# Patient Record
Sex: FEMALE | Race: AMERICAN INDIAN OR ALASKA NATIVE
[De-identification: names, ages, dates, MRNs, and addresses within clinical notes are randomized per-mention and may not be internally consistent; named-entity substitution may affect disease eponyms.]

---

## 2020-12-14 ENCOUNTER — HOSPITAL ENCOUNTER (EMERGENCY)
Dept: HOSPITAL 5 - ED | Age: 40
LOS: 1 days | Discharge: LEFT BEFORE DISCHARGE | End: 2020-12-15
Payer: MEDICAID

## 2020-12-14 VITALS — DIASTOLIC BLOOD PRESSURE: 91 MMHG | SYSTOLIC BLOOD PRESSURE: 151 MMHG

## 2020-12-14 DIAGNOSIS — K64.8: Primary | ICD-10-CM

## 2020-12-14 DIAGNOSIS — Z53.21: ICD-10-CM

## 2020-12-15 ENCOUNTER — HOSPITAL ENCOUNTER (EMERGENCY)
Dept: HOSPITAL 5 - ED | Age: 40
Discharge: HOME | End: 2020-12-15
Payer: MEDICAID

## 2020-12-15 VITALS — SYSTOLIC BLOOD PRESSURE: 137 MMHG | DIASTOLIC BLOOD PRESSURE: 71 MMHG

## 2020-12-15 DIAGNOSIS — K59.00: ICD-10-CM

## 2020-12-15 DIAGNOSIS — N39.0: Primary | ICD-10-CM

## 2020-12-15 LAB
%HYPO/RBC NFR BLD AUTO: (no result) %
ALBUMIN SERPL-MCNC: 3.6 G/DL (ref 3.9–5)
ALT SERPL-CCNC: 17 UNITS/L (ref 7–56)
BAND NEUTROPHILS # (MANUAL): 0 K/MM3
BILIRUB UR QL STRIP: (no result)
BLOOD UR QL VISUAL: (no result)
BUN SERPL-MCNC: 14 MG/DL (ref 7–17)
BUN/CREAT SERPL: 23 %
CALCIUM SERPL-MCNC: 9.4 MG/DL (ref 8.4–10.2)
HCT VFR BLD CALC: 33.6 % (ref 30.3–42.9)
HEMOLYSIS INDEX: 9
HGB BLD-MCNC: 10.7 GM/DL (ref 10.1–14.3)
MCHC RBC AUTO-ENTMCNC: 32 % (ref 30–34)
MCV RBC AUTO: 79 FL (ref 79–97)
MUCOUS THREADS #/AREA URNS HPF: (no result) /HPF
MYELOCYTES # (MANUAL): 0 K/MM3
PH UR STRIP: 6 [PH] (ref 5–7)
PLATELET # BLD: 259 K/MM3 (ref 140–440)
PROMYELOCYTES # (MANUAL): 0 K/MM3
RBC # BLD AUTO: 4.23 M/MM3 (ref 3.65–5.03)
RBC #/AREA URNS HPF: 163 /HPF (ref 0–6)
TOTAL CELLS COUNTED BLD: 100
UROBILINOGEN UR-MCNC: < 2 MG/DL (ref ?–2)
WBC #/AREA URNS HPF: > 182 /HPF (ref 0–6)

## 2020-12-15 PROCEDURE — 74018 RADEX ABDOMEN 1 VIEW: CPT

## 2020-12-15 PROCEDURE — 85007 BL SMEAR W/DIFF WBC COUNT: CPT

## 2020-12-15 PROCEDURE — 96372 THER/PROPH/DIAG INJ SC/IM: CPT

## 2020-12-15 PROCEDURE — 81001 URINALYSIS AUTO W/SCOPE: CPT

## 2020-12-15 PROCEDURE — 80053 COMPREHEN METABOLIC PANEL: CPT

## 2020-12-15 PROCEDURE — 84703 CHORIONIC GONADOTROPIN ASSAY: CPT

## 2020-12-15 PROCEDURE — 99284 EMERGENCY DEPT VISIT MOD MDM: CPT

## 2020-12-15 PROCEDURE — 85025 COMPLETE CBC W/AUTO DIFF WBC: CPT

## 2020-12-15 PROCEDURE — 71045 X-RAY EXAM CHEST 1 VIEW: CPT

## 2020-12-15 PROCEDURE — 36415 COLL VENOUS BLD VENIPUNCTURE: CPT

## 2020-12-15 NOTE — XRAY REPORT
ABDOMEN 1 VIEW(S)



INDICATION / CLINICAL INFORMATION:

abd pain.



COMPARISON: 

None available.



FINDINGS:



TUBES / LINES: None.

BOWEL GAS PATTERN: Constipation. No dilated loops of small bowel. 

FREE AIR / EXTRALUMINAL GAS: None seen.



ADDITIONAL FINDINGS: No significant additional findings.



IMPRESSION:

1. Constipation.



Signer Name: Alfonso Rockwell MD 

Signed: 12/15/2020 8:43 PM

Workstation Name: [id number]

## 2020-12-15 NOTE — EVENT NOTE
ED Screening Note


ED Screening Note: 








hemorrhoids 


states she she has been around someone with COVID 19


suprapubic abd pain 


+dysuria 


PMHx AIDS


not seeing infectious disease 


not on any antivirals 


no allergies to meds 


LNMP: november This initial assessment/diagnostic orders/clinical plan/treatment(s) is/are 

subject to change based on patients health status, clinical progression and re-

assessment by fellow clinical providers in the ED. Further treatment and workup 

at subsequent clinical providers discretion. Patient/guardian urged not to elope

from the ED as their condition may be serious if not clinically assessed and 

managed. 





Initial orders include: 


labs, UA

## 2020-12-15 NOTE — EMERGENCY DEPARTMENT REPORT
ED General Adult HPI





- General


Chief complaint: Upper Respiratory Infection


Stated complaint: SYMPTOMS OF COVID, KIDNEY INFECTION


Time Seen by Provider: 12/15/20 15:10


Source: patient


Mode of arrival: Ambulatory


Limitations: No Limitations





- History of Present Illness


Initial comments: 


Patient is a 39-year-old female with a history of HIV, who presents for 

constipation, blood-streaked stool x1 history of hemorrhoids, states exposure to

Covid positive family member.  Patient denies fevers or chills.  States 

abdominal aching and spasms for the past 3 days.  Patient is tolerating p.o. 

intake without nausea vomiting.  There is no fever or chills, patient does 

however endorse dysuria and frequency.  











Severity scale (0 -10): 3





- Related Data


                                  Previous Rx's











 Medication  Instructions  Recorded  Last Taken  Type


 


Docusate Sodium [Colace] 100 mg PO BID #30 capsule 12/15/20 Unknown Rx


 


Fluconazole (Nf) [Diflucan TAB] 150 mg PO ONCE #1 tablet 12/15/20 Unknown Rx


 


Hydrocortisone [Anusol-Hc 2.5% TOP 1 applicatio RC BID #1 tube 12/15/20 Unknown 

Rx





CREAM]    


 


Nitrofurantoin Mono/M-Cryst 100 mg PO Q12HR 7 Days #14 capsule 12/15/20 Unknown 

Rx





[Macrobid CAP]    


 


polyethylene glycoL 3350 [Miralax 17 gm PO BID PRN #14 packet 12/15/20 Unknown 

Rx





3350]    











                                    Allergies











Allergy/AdvReac Type Severity Reaction Status Date / Time


 


No Known Allergies Allergy   Unverified 12/14/20 23:38














ED Review of Systems


ROS: 


Stated complaint: SYMPTOMS OF COVID, KIDNEY INFECTION


Other details as noted in HPI





Constitutional: denies: chills, fever


Eyes: denies: eye pain, eye discharge, vision change


ENT: denies: ear pain, throat pain


Respiratory: denies: cough, shortness of breath, wheezing


Cardiovascular: denies: chest pain, palpitations


Endocrine: no symptoms reported


Gastrointestinal: constipation.  denies: abdominal pain, nausea, vomiting, 

diarrhea, melena


Genitourinary: denies: urgency, dysuria, frequency, hematuria, discharge


Musculoskeletal: as per HPI, back pain


Skin: denies: rash, lesions


Neurological: denies: headache, weakness, paresthesias


Psychiatric: denies: anxiety, depression


Hematological/Lymphatic: as per HPI





ED Past Medical Hx





- Past Medical History


Previous Medical History?: Yes


Additional medical history: AIDS





- Surgical History


Past Surgical History?: Yes





- Social History


Smoking Status: Never Smoker


Substance Use Type: None





- Medications


Home Medications: 


                                Home Medications











 Medication  Instructions  Recorded  Confirmed  Last Taken  Type


 


Docusate Sodium [Colace] 100 mg PO BID #30 capsule 12/15/20  Unknown Rx


 


Fluconazole (Nf) [Diflucan TAB] 150 mg PO ONCE #1 tablet 12/15/20  Unknown Rx


 


Hydrocortisone [Anusol-Hc 2.5% TOP 1 applicatio RC BID #1 tube 12/15/20  Unknown

 Rx





CREAM]     


 


Nitrofurantoin Mono/M-Cryst 100 mg PO Q12HR 7 Days #14 capsule 12/15/20  Unknown

Rx





[Macrobid CAP]     


 


polyethylene glycoL 3350 [Miralax 17 gm PO BID PRN #14 packet 12/15/20  Unknown 

Rx





3350]     














ED Physical Exam





- General


Limitations: No Limitations


General appearance: alert, in no apparent distress





- Head


Head exam: Present: atraumatic, normocephalic





- Eye


Eye exam: Present: normal appearance, EOMI


Pupils: Present: normal accommodation





- ENT


ENT exam: Present: normal exam





- Neck


Neck exam: Present: normal inspection, full ROM.  Absent: tenderness





- Respiratory


Respiratory exam: Present: normal lung sounds bilaterally.  Absent: respiratory 

distress, wheezes, stridor, chest wall tenderness





- Cardiovascular


Cardiovascular Exam: Present: regular rate, normal rhythm, normal heart sounds. 

Absent: systolic murmur, diastolic murmur, rubs, gallop





- GI/Abdominal


GI/Abdominal exam: Present: soft, normal bowel sounds.  Absent: distended, 

tenderness, guarding, rigid, bruit, hernia





- Rectal


Rectal exam: Present: deferred





- Extremities Exam


Extremities exam: Present: normal inspection, full ROM, normal capillary refill.

 Absent: tenderness





- Back Exam


Back exam: Present: normal inspection, full ROM.  Absent: tenderness, CVA 

tenderness (R), CVA tenderness (L), vertebral tenderness





- Neurological Exam


Neurological exam: Present: alert, oriented X3, CN II-XII intact, normal gait, 

reflexes normal





- Psychiatric


Psychiatric exam: Present: normal affect, normal mood





- Skin


Skin exam: Present: warm, dry, intact, normal color.  Absent: rash





ED Course


                                   Vital Signs











  12/15/20





  12:45


 


Temperature 98.1 F


 


Pulse Rate 64


 


Respiratory 18





Rate 


 


Blood Pressure 137/71





[Right] 


 


O2 Sat by Pulse 99





Oximetry 














ED Medical Decision Making





- Lab Data


Result diagrams: 


                                 12/15/20 15:34





                                 12/15/20 15:34








Labs











  12/15/20 12/15/20 12/15/20





  15:34 15:34 15:34


 


WBC  3.0 L  


 


RBC  4.23  


 


Hgb  10.7  


 


Hct  33.6  


 


MCV  79  


 


MCH  25 L  


 


MCHC  32  


 


RDW  14.7  


 


Plt Count  259  


 


Eos % (Auto)  Np  


 


Add Manual Diff  Complete  


 


Total Counted  100  


 


Seg Neuts % (Manual)  64.0  


 


Lymphocytes % (Manual)  8.0 L  


 


Monocytes % (Manual)  9.0 H  


 


Basophils % (Manual)  1.0  


 


Nucleated RBC %  Not Reportable  


 


Seg Neutrophils # Man  1.9  


 


Band Neutrophils #  0.0  


 


Lymphocytes # (Manual)  0.2 L  


 


Abs React Lymphs (Man)  0.0  


 


Monocytes # (Manual)  0.3  


 


Eosinophils # (Manual)  0.5 H  


 


Basophils # (Manual)  0.0  


 


Metamyelocytes #  0.0  


 


Myelocytes #  0.0  


 


Promyelocytes #  0.0  


 


Blast Cells #  0.0  


 


WBC Morphology  Not Reportable  


 


Hypersegmented Neuts  Not Reportable  


 


Hyposegmented Neuts  Not Reportable  


 


Hypogranular Neuts  Not Reportable  


 


Smudge Cells  Not Reportable  


 


Toxic Granulation  Not Reportable  


 


Toxic Vacuolation  Not Reportable  


 


Dohle Bodies  Not Reportable  


 


Pelger-Huet Anomaly  Not Reportable  


 


Slava Rods  Not Reportable  


 


Platelet Estimate  Not Reportable  


 


Clumped Platelets  Not Reportable  


 


Plt Clumps, EDTA  Not Reportable  


 


Large Platelets  Not Reportable  


 


Giant Platelets  Not Reportable  


 


Platelet Satelliting  Not Reportable  


 


Plt Morphology Comment  Not Reportable  


 


RBC Morphology  Not Reportable  


 


Dimorphic RBCs  Not Reportable  


 


Polychromasia  Not Reportable  


 


Hypochromasia  1+  


 


Poikilocytosis  Not Reportable  


 


Anisocytosis  Few  


 


Microcytosis  Not Reportable  


 


Macrocytosis  Not Reportable  


 


Spherocytes  Not Reportable  


 


Pappenheimer Bodies  Not Reportable  


 


Sickle Cells  Not Reportable  


 


Target Cells  Not Reportable  


 


Tear Drop Cells  Not Reportable  


 


Ovalocytes  Not Reportable  


 


Helmet Cells  Not Reportable  


 


[place] Bodies  Not Reportable  


 


Cabot Rings  Not Reportable  


 


Arthur Cells  Not Reportable  


 


Bite Cells  Not Reportable  


 


Crenated Cell  Not Reportable  


 


Elliptocytes  Not Reportable  


 


Acanthocytes (Spur)  Not Reportable  


 


Rouleaux  Not Reportable  


 


Hemoglobin C Crystals  Not Reportable  


 


Schistocytes  Not Reportable  


 


Malaria parasites  Not Reportable  


 


Arturo Bodies  Not Reportable  


 


Hem Pathologist Commnt  No  


 


Sodium   137 


 


Potassium   4.2 


 


Chloride   104.1 


 


Carbon Dioxide   24 


 


Anion Gap   13 


 


BUN   14 


 


Creatinine   0.6 


 


Estimated GFR   > 60 


 


BUN/Creatinine Ratio   23 


 


Glucose   93 


 


Calcium   9.4 


 


Total Bilirubin   < 0.20 


 


AST   27 


 


ALT   17 


 


Alkaline Phosphatase   73 


 


Total Protein   7.8 


 


Albumin   3.6 L 


 


Albumin/Globulin Ratio   0.9 


 


HCG, Qual    Negative


 


Urine Color   


 


Urine Turbidity   


 


Urine pH   


 


Ur Specific Gravity   


 


Urine Protein   


 


Urine Glucose (UA)   


 


Urine Ketones   


 


Urine Blood   


 


Urine Nitrite   


 


Urine Bilirubin   


 


Urine Urobilinogen   


 


Ur Leukocyte Esterase   


 


Urine WBC (Auto)   


 


Urine RBC (Auto)   


 


U Epithel Cells (Auto)   


 


Urine WBC Clumps   


 


Urine Mucus   


 


Urine Yeast (Budding)   














  12/15/20





  Unknown


 


WBC 


 


RBC 


 


Hgb 


 


Hct 


 


MCV 


 


MCH 


 


MCHC 


 


RDW 


 


Plt Count 


 


Eos % (Auto) 


 


Add Manual Diff 


 


Total Counted 


 


Seg Neuts % (Manual) 


 


Lymphocytes % (Manual) 


 


Monocytes % (Manual) 


 


Basophils % (Manual) 


 


Nucleated RBC % 


 


Seg Neutrophils # Man 


 


Band Neutrophils # 


 


Lymphocytes # (Manual) 


 


Abs React Lymphs (Man) 


 


Monocytes # (Manual) 


 


Eosinophils # (Manual) 


 


Basophils # (Manual) 


 


Metamyelocytes # 


 


Myelocytes # 


 


Promyelocytes # 


 


Blast Cells # 


 


WBC Morphology 


 


Hypersegmented Neuts 


 


Hyposegmented Neuts 


 


Hypogranular Neuts 


 


Smudge Cells 


 


Toxic Granulation 


 


Toxic Vacuolation 


 


Dohle Bodies 


 


Pelger-Huet Anomaly 


 


Slava Rods 


 


Platelet Estimate 


 


Clumped Platelets 


 


Plt Clumps, EDTA 


 


Large Platelets 


 


Giant Platelets 


 


Platelet Satelliting 


 


Plt Morphology Comment 


 


RBC Morphology 


 


Dimorphic RBCs 


 


Polychromasia 


 


Hypochromasia 


 


Poikilocytosis 


 


Anisocytosis 


 


Microcytosis 


 


Macrocytosis 


 


Spherocytes 


 


Pappenheimer Bodies 


 


Sickle Cells 


 


Target Cells 


 


Tear Drop Cells 


 


Ovalocytes 


 


Helmet Cells 


 


[place] Bodies 


 


Cabot Rings 


 


Arthur Cells 


 


Bite Cells 


 


Crenated Cell 


 


Elliptocytes 


 


Acanthocytes (Spur) 


 


Rouleaux 


 


Hemoglobin C Crystals 


 


Schistocytes 


 


Malaria parasites 


 


Arturo Bodies 


 


Hem Pathologist Commnt 


 


Sodium 


 


Potassium 


 


Chloride 


 


Carbon Dioxide 


 


Anion Gap 


 


BUN 


 


Creatinine 


 


Estimated GFR 


 


BUN/Creatinine Ratio 


 


Glucose 


 


Calcium 


 


Total Bilirubin 


 


AST 


 


ALT 


 


Alkaline Phosphatase 


 


Total Protein 


 


Albumin 


 


Albumin/Globulin Ratio 


 


HCG, Qual 


 


Urine Color  Yellow


 


Urine Turbidity  Cloudy


 


Urine pH  6.0


 


Ur Specific Gravity  1.016


 


Urine Protein  100 mg/dl


 


Urine Glucose (UA)  Neg


 


Urine Ketones  Neg


 


Urine Blood  Mod


 


Urine Nitrite  Neg


 


Urine Bilirubin  Neg


 


Urine Urobilinogen  < 2.0


 


Ur Leukocyte Esterase  Lg


 


Urine WBC (Auto)  > 182.0 H


 


Urine RBC (Auto)  163.0


 


U Epithel Cells (Auto)  9.0


 


Urine WBC Clumps  3+


 


Urine Mucus  Few


 


Urine Yeast (Budding)  3+














- Radiology Data


Radiology results: report reviewed, image reviewed


Findings


Reporting MD: Alfonso Rockwell Dictation Time: December 15, 2020 19:43 Transcription

ist: Not available Transcription Date:


 


ABDOMEN 1 VIEW(S)  


 


INDICATION / CLINICAL INFORMATION:  abd pain.  


 


COMPARISON:  None available.  


 


FINDINGS:  


 


TUBES / LINES: None.  BOWEL GAS PATTERN: Constipation. No dilated loops of small

 bowel.  FREE AIR / EXTRALUMINAL GAS: None seen.  


 


ADDITIONAL FINDINGS: No significant additional findings.  


 


IMPRESSION:  1. Constipation.  


 


Signer Name: Alfonso Rockwell MD  Signed: 12/15/2020 7:43 PM  Workstation Name: 

[id number] [id number]








Findings


Reporting MD: Alfonso Rockwell Dictation Time: December 15, 2020 19:43 

: Not available Transcription Date:


 


CHEST 1 VIEW  


 


INDICATION:  cough productive.  


 


COMPARISON:  None.  


 


FINDINGS:  


 


Support devices: None.  Heart: Normal.  Lungs/Pleura: No acute pulmonary or 

pleural findings.  


 


 


 


IMPRESSION:  1. No acute findings.  


 


 


Signer Name: Alfonso Rockwell MD  Signed: 12/15/2020 7:43 PM  Workstation Name: 

[id number] [id number]





- Medical Decision Making


Check chest x-ray normal no infiltrates no opacities.  KUB demonstrates moderate

 constipation, no abnormal gas pattern, no calcifications.  Patient is voiding 

with some dysuria.  There is no fever no chills no CVA tenderness this is not 

likely pyelonephritis.  UA positive for leukocytes white blood cells small 

yeast, symptoms are relieved with medications given in ED.  Patient is 

tolerating p.o. intake.  Plan DC to home with prescriptions .  Patient will 

follow-up with primary care doctor in 2 to 3 days.  Patient given referral to 

infectious disease Dr. Aparicio.  Patient agrees to follow-up with same.  Patient

 will be DC'd home in stable condition at this time.





Critical care attestation.: 


If time is entered above; I have spent that time in minutes in the direct care 

of this critically ill patient, excluding procedure time.








ED Disposition


Clinical Impression: 


 Hx of hemorrhoids





UTI (urinary tract infection)


Qualifiers:


 Urinary tract infection type: acute cystitis Hematuria presence: without 

hematuria Qualified Code(s): N30.00 - Acute cystitis without hematuria





Constipation


Qualifiers:


 Constipation type: unspecified constipation type Qualified Code(s): K59.00 - 

Constipation, unspecified





Disposition: DC-01 TO HOME OR SELFCARE


Is pt being admited?: No


Does the pt Need Aspirin: No


Condition: Stable


Instructions:  Constipation, Adult, Urinary Tract Infection, Adult, Probiotics, 

Hemorrhoids, Easy-to-Read


Prescriptions: 


Hydrocortisone [Anusol-Hc 2.5% TOP CREAM] 1 applicatio RC BID #1 tube


Docusate Sodium [Colace] 100 mg PO BID #30 capsule


Fluconazole (Nf) [Diflucan TAB] 150 mg PO ONCE #1 tablet


Nitrofurantoin Hickman/M-Cryst [Macrobid CAP] 100 mg PO Q12HR 7 Days #14 capsule


polyethylene glycoL 3350 [Miralax 3350] 17 gm PO BID PRN #14 packet


 PRN Reason: Constipation


Referrals: 


[facility] [Provider Group] - 3-5 Days


THOM APARICIO MD [Staff Physician] - 3-5 Days


Time of Disposition: 21:50

## 2020-12-15 NOTE — XRAY REPORT
CHEST 1 VIEW



INDICATION: 

cough productive.



COMPARISON: 

None.



FINDINGS:



Support devices: None.

Heart: Normal. 

Lungs/Pleura: No acute pulmonary or pleural findings.  







IMPRESSION:

1. No acute findings.

 



Signer Name: Alfonso Rockwell MD 

Signed: 12/15/2020 8:43 PM

Workstation Name: [id number]

## 2021-04-26 ENCOUNTER — HOSPITAL ENCOUNTER (EMERGENCY)
Dept: HOSPITAL 5 - ED | Age: 41
Discharge: HOME | End: 2021-04-26
Payer: MEDICAID

## 2021-04-26 VITALS — SYSTOLIC BLOOD PRESSURE: 111 MMHG | DIASTOLIC BLOOD PRESSURE: 57 MMHG

## 2021-04-26 DIAGNOSIS — R07.89: ICD-10-CM

## 2021-04-26 DIAGNOSIS — Z79.899: ICD-10-CM

## 2021-04-26 DIAGNOSIS — N76.2: Primary | ICD-10-CM

## 2021-04-26 LAB
ALBUMIN SERPL-MCNC: 3.8 G/DL (ref 3.9–5)
ALT SERPL-CCNC: 12 UNITS/L (ref 7–56)
ANISOCYTOSIS BLD QL SMEAR: (no result)
BAND NEUTROPHILS # (MANUAL): 0 K/MM3
BUN SERPL-MCNC: 17 MG/DL (ref 7–17)
BUN/CREAT SERPL: 17 %
CALCIUM SERPL-MCNC: 9.6 MG/DL (ref 8.4–10.2)
HCT VFR BLD CALC: 32.7 % (ref 30.3–42.9)
HEMOLYSIS INDEX: 5
HGB BLD-MCNC: 11.3 GM/DL (ref 10.1–14.3)
MCHC RBC AUTO-ENTMCNC: 35 % (ref 30–34)
MCV RBC AUTO: 82 FL (ref 79–97)
MYELOCYTES # (MANUAL): 0 K/MM3
PLATELET # BLD: 196 K/MM3 (ref 140–440)
PROMYELOCYTES # (MANUAL): 0 K/MM3
RBC # BLD AUTO: 4 M/MM3 (ref 3.65–5.03)
TOTAL CELLS COUNTED BLD: 100

## 2021-04-26 PROCEDURE — 93005 ELECTROCARDIOGRAM TRACING: CPT

## 2021-04-26 PROCEDURE — 36415 COLL VENOUS BLD VENIPUNCTURE: CPT

## 2021-04-26 PROCEDURE — 84484 ASSAY OF TROPONIN QUANT: CPT

## 2021-04-26 PROCEDURE — 85007 BL SMEAR W/DIFF WBC COUNT: CPT

## 2021-04-26 PROCEDURE — 80053 COMPREHEN METABOLIC PANEL: CPT

## 2021-04-26 PROCEDURE — 71045 X-RAY EXAM CHEST 1 VIEW: CPT

## 2021-04-26 PROCEDURE — 85025 COMPLETE CBC W/AUTO DIFF WBC: CPT

## 2021-04-26 PROCEDURE — 84703 CHORIONIC GONADOTROPIN ASSAY: CPT

## 2021-04-26 NOTE — EMERGENCY DEPARTMENT REPORT
ED General Adult HPI





- General


Chief complaint: Chest Pain


Stated complaint: CHEST PAIN


Time Seen by Provider: 04/26/21 02:51


Source: patient


Mode of arrival: Wheelchair


Limitations: No Limitations





- History of Present Illness


Initial comments: 





Patient is 40 years old female with history of HIV.  Patient presented to the ER

with to complain.  Patient stated that she is having chest pain for a while.  

She stated that pain is intermittent comes and goes.  Sharp with no radiation.  

Patient denied any fever or chills.  No cough or shortness of breath.  Patient 

also complaining of sore on her private area for more than a month.  Patient 

stated that she is not on her HIV medication.


Severity scale (0 -10): 0





- Related Data


                                  Previous Rx's











 Medication  Instructions  Recorded  Last Taken  Type


 


Docusate Sodium [Colace] 100 mg PO BID #30 capsule 12/15/20 Unknown Rx


 


Fluconazole (Nf) [Diflucan TAB] 150 mg PO ONCE #1 tablet 12/15/20 Unknown Rx


 


Hydrocortisone [Anusol-Hc 2.5% TOP 1 applicatio RC BID #1 tube 12/15/20 Unknown 

Rx





CREAM]    


 


Nitrofurantoin Mono/M-Cryst 100 mg PO Q12HR 7 Days #14 capsule 12/15/20 Unknown 

Rx





[Macrobid CAP]    


 


polyethylene glycoL 3350 [Miralax 17 gm PO BID PRN #14 packet 12/15/20 Unknown 

Rx





3350]    











                                    Allergies











Allergy/AdvReac Type Severity Reaction Status Date / Time


 


No Known Allergies Allergy   Unverified 12/14/20 23:38














ED Review of Systems


ROS: 


Stated complaint: CHEST PAIN


Other details as noted in HPI





Comment: All other systems reviewed and negative


Constitutional: denies: chills, fever


Respiratory: denies: cough, shortness of breath


Cardiovascular: chest pain.  denies: palpitations


Gastrointestinal: denies: abdominal pain, nausea


Musculoskeletal: denies: back pain


Neurological: denies: headache, weakness





ED Past Medical Hx





- Past Medical History


Previous Medical History?: Yes


Additional medical history: AIDS





- Surgical History


Past Surgical History?: No





- Social History


Smoking Status: Never Smoker


Substance Use Type: None





- Medications


Home Medications: 


                                Home Medications











 Medication  Instructions  Recorded  Confirmed  Last Taken  Type


 


Docusate Sodium [Colace] 100 mg PO BID #30 capsule 12/15/20  Unknown Rx


 


Fluconazole (Nf) [Diflucan TAB] 150 mg PO ONCE #1 tablet 12/15/20  Unknown Rx


 


Hydrocortisone [Anusol-Hc 2.5% TOP 1 applicatio RC BID #1 tube 12/15/20  Unknown

 Rx





CREAM]     


 


Nitrofurantoin Mono/M-Cryst 100 mg PO Q12HR 7 Days #14 capsule 12/15/20  Unknown

Rx





[Macrobid CAP]     


 


polyethylene glycoL 3350 [Miralax 17 gm PO BID PRN #14 packet 12/15/20  Unknown 

Rx





3350]     














ED Physical Exam





- General


Limitations: No Limitations


General appearance: alert, in no apparent distress





- Head


Head exam: Present: atraumatic, normocephalic, normal inspection





- Eye


Eye exam: Present: normal appearance





- ENT


ENT exam: Present: normal exam





- Neck


Neck exam: Present: normal inspection





- Respiratory


Respiratory exam: Present: normal lung sounds bilaterally





- Cardiovascular


Cardiovascular Exam: Present: normal heart sounds





- GI/Abdominal


GI/Abdominal exam: Present: soft, normal bowel sounds.  Absent: distended, 

tenderness, guarding, rebound, rigid, mass, bruit, pulsatile mass, hernia





- 


External exam: Present: lesions, other (Chaperoned by nurse Arteaga)





- Extremities Exam


Extremities exam: Present: normal inspection, full ROM, normal capillary refill.

 Absent: calf tenderness





- Back Exam


Back exam: Present: normal inspection, full ROM.  Absent: CVA tenderness (R), 

CVA tenderness (L)





- Neurological Exam


Neurological exam: Present: alert, oriented X3, CN II-XII intact, normal gait





- Psychiatric


Psychiatric exam: Present: normal mood





- Skin


Skin exam: Present: warm, intact, normal color





ED Course





                                   Vital Signs











  04/26/21 04/26/21





  02:53 04:13


 


Temperature 98 F 98.2 F


 


Pulse Rate 84 79


 


Respiratory 16 16





Rate  


 


Blood Pressure 138/88 


 


Blood Pressure  99/56





[Left]  


 


O2 Sat by Pulse 96 100





Oximetry  














ED Medical Decision Making





- Lab Data


Result diagrams: 


                                 04/26/21 03:10





                                 04/26/21 03:10





- EKG Data


-: EKG Interpreted by Me


EKG shows normal: sinus rhythm


Rate: normal





- EKG Data


Interpretation: no acute changes





- Radiology Data


Radiology results: report reviewed





- Medical Decision Making





Patient is 40 years old female with history of HIV.  Patient presented to the ER

 with to complain.  Patient stated that she is having chest pain for a while.  

She stated that pain is intermittent comes and goes.  Sharp with no radiation.  

Patient denied any fever or chills.  No cough or shortness of breath.  Patient 

also complaining of sore on her private area for more than a month.  Patient 

stated that she is not on her HIV medication.





EKG is unremarkable.  Chest x-ray is negative for acute finding.  Labs reviewed 

and is unremarkable including a negative troponin.  Patient given prescription 

for Keflex and Naprosyn and advised to follow-up with primary doctor in the next

 2 to 3 days and to return to the ER if she develop any new symptoms.


Critical care attestation.: 


If time is entered above; I have spent that time in minutes in the direct care o

f this critically ill patient, excluding procedure time.








ED Disposition


Clinical Impression: 


 Atypical chest pain, Vulval cellulitis





Disposition: DC-01 TO HOME OR SELFCARE


Is pt being admited?: No


Condition: Stable


Instructions:  Nonspecific Chest Pain, Adult, Costochondritis


Referrals: 


[facility] [Provider Group] - 3-5 Days

## 2021-04-26 NOTE — EVENT NOTE
ED Screening Note


Date of service: 04/26/21


Time: 02:56


ED Screening Note: 


Patient complains of chest pain and hematuria today


History of HIV, not currently on ART 








This initial assessment/diagnostic orders/clinical plan/treatment(s) is/are 

subject to change based on patients health status, clinical progression and re-

assessment by fellow clinical providers in the ED. Further treatment and workup 

at subsequent clinical providers discretion. Patient/guardian urged not to elope

from the ED as their condition may be serious if not clinically assessed and 

managed. 





Initial orders include: 


Labs


EKG


Chest x-ray

## 2021-04-26 NOTE — XRAY REPORT
CHEST 1 VIEW 4/26/2021 3:09 AM



INDICATION / CLINICAL INFORMATION: Chest pain.



COMPARISON: 12/15/20



FINDINGS:



SUPPORT DEVICES: None.

HEART / MEDIASTINUM: The heart size and pulmonary vasculature are normal. The aorta is normal in paulie
behzad. 

LUNGS / PLEURA: No significant pulmonary or pleural abnormality. No pneumothorax. 



ADDITIONAL FINDINGS: No significant additional findings.



IMPRESSION: No acute abnormality or significant change.



Signer Name: Maynor Liu MD 

Signed: 4/26/2021 4:19 AM

Workstation Name: [id number]

## 2021-04-29 NOTE — ELECTROCARDIOGRAPH REPORT
[facility]

                                       

Test Date:    2021               Test Time:    03:03:54

Pat Name:     MING GONZALES           Department:   

Patient ID:   [id number]          Room:          

Gender:       F                        Technician:   HARRISON

:          1980               Requested By: GHADA BERUMEN

Order Number: [id number]              Reading MD:   Juan A Mauro

                                 Measurements

Intervals                              Axis          

Rate:         72                       P:            37

WI:           133                      QRS:          63

QRSD:         92                       T:            25

QT:           394                                    

QTc:          433                                    

                           Interpretive Statements

Sinus rhythm

No previous ECG available for comparison

Electronically Signed On 2021 10:28:07 EDT by Juan A Mauro

## 2021-05-08 ENCOUNTER — HOSPITAL ENCOUNTER (EMERGENCY)
Dept: HOSPITAL 5 - ED | Age: 41
LOS: 1 days | Discharge: HOME | End: 2021-05-09
Payer: MEDICAID

## 2021-05-08 DIAGNOSIS — R50.83: Primary | ICD-10-CM

## 2021-05-08 DIAGNOSIS — Z79.899: ICD-10-CM

## 2021-05-08 LAB
BASOPHILS # (AUTO): 0 K/MM3 (ref 0–0.1)
BASOPHILS NFR BLD AUTO: 0.5 % (ref 0–1.8)
EOSINOPHIL # BLD AUTO: 0.6 K/MM3 (ref 0–0.4)
EOSINOPHIL NFR BLD AUTO: 12.5 % (ref 0–4.3)
HCT VFR BLD CALC: 33.9 % (ref 30.3–42.9)
HGB BLD-MCNC: 10.9 GM/DL (ref 10.1–14.3)
LYMPHOCYTES # BLD AUTO: 0.3 K/MM3 (ref 1.2–5.4)
LYMPHOCYTES NFR BLD AUTO: 5.6 % (ref 13.4–35)
MCHC RBC AUTO-ENTMCNC: 32 % (ref 30–34)
MCV RBC AUTO: 83 FL (ref 79–97)
MONOCYTES # (AUTO): 0.4 K/MM3 (ref 0–0.8)
MONOCYTES % (AUTO): 9 % (ref 0–7.3)
PLATELET # BLD: 221 K/MM3 (ref 140–440)
RBC # BLD AUTO: 4.1 M/MM3 (ref 3.65–5.03)

## 2021-05-08 PROCEDURE — 71046 X-RAY EXAM CHEST 2 VIEWS: CPT

## 2021-05-08 PROCEDURE — 85025 COMPLETE CBC W/AUTO DIFF WBC: CPT

## 2021-05-08 PROCEDURE — 93005 ELECTROCARDIOGRAM TRACING: CPT

## 2021-05-08 PROCEDURE — 36415 COLL VENOUS BLD VENIPUNCTURE: CPT

## 2021-05-08 PROCEDURE — 80053 COMPREHEN METABOLIC PANEL: CPT

## 2021-05-08 PROCEDURE — 84484 ASSAY OF TROPONIN QUANT: CPT

## 2021-05-08 NOTE — EVENT NOTE
ED Screening Note


Date of service: 05/08/21


Time: 23:28


ED Screening Note: 


Pt c/o fever, chills, and bodyaches after receiving covid vaccine today at [place]


+HIV-was not on ART when last seen here in ED 4/28/21








This initial assessment/diagnostic orders/clinical plan/treatment(s) is/are 

subject to change based on patients health status, clinical progression and re-

assessment by fellow clinical providers in the ED. Further treatment and workup 

at subsequent clinical providers discretion. Patient/guardian urged not to elope

from the ED as their condition may be serious if not clinically assessed and 

managed. 





Initial orders include: 


labs


cxr

## 2021-05-09 VITALS — DIASTOLIC BLOOD PRESSURE: 70 MMHG | SYSTOLIC BLOOD PRESSURE: 109 MMHG

## 2021-05-09 LAB
ALBUMIN SERPL-MCNC: 4.2 G/DL (ref 3.9–5)
ALT SERPL-CCNC: 14 UNITS/L (ref 7–56)
BUN SERPL-MCNC: 12 MG/DL (ref 7–17)
BUN/CREAT SERPL: 15 %
CALCIUM SERPL-MCNC: 9.3 MG/DL (ref 8.4–10.2)
HEMOLYSIS INDEX: 6

## 2021-05-09 NOTE — XRAY REPORT
CHEST 2 VIEWS 



INDICATION: 

fever.



COMPARISON: 

4/26/2021



FINDINGS:

Support devices: None.



Heart: Within normal limits. 

Lungs/Pleura: No acute air space or interstitial disease.   No significant pleural effusion. 



IMPRESSION:  No acute findings.



Signer Name: Jean Pierre Gamble MD 

Signed: 5/9/2021 12:03 AM

Workstation Name: [id number]

## 2021-05-09 NOTE — EMERGENCY DEPARTMENT REPORT
HPI





- General


Chief Complaint: Fever


Time Seen by Provider: 21 23:27





- HPI


HPI: 





Room 25








The patient is a 40-year-old female present with a chief complaint fever and 

chills.  The patient states she received her Covid vaccination yesterday and 

since then she has had body aches and chills.  Patient denies cough but states 

she felt like her chest was "closing in."  Patient sleeping on stretcher 

requiring frequent stimulation to answer questions during the interview.  

Patient seems annoyed by questions.





ED Past Medical Hx





- Past Medical History


Previous Medical History?: Yes


Additional medical history: AIDS





- Surgical History


Past Surgical History?: No





- Family History


Family history: no significant





- Social History


Smoking Status: Never Smoker


Substance Use Type: None





- Medications


Home Medications: 


                                Home Medications











 Medication  Instructions  Recorded  Confirmed  Last Taken  Type


 


Docusate Sodium [Colace] 100 mg PO BID #30 capsule 12/15/20  Unknown Rx


 


Fluconazole (Nf) [Diflucan TAB] 150 mg PO ONCE #1 tablet 12/15/20  Unknown Rx


 


Hydrocortisone [Anusol-Hc 2.5% TOP 1 applicatio RC BID #1 tube 12/15/20  Unknown

 Rx





CREAM]     


 


Nitrofurantoin Mono/M-Cryst 100 mg PO Q12HR 7 Days #14 capsule 12/15/20  Unknown

Rx





[Macrobid CAP]     


 


polyethylene glycoL 3350 [Miralax 17 gm PO BID PRN #14 packet 12/15/20  Unknown 

Rx





3350]     


 


Naproxen [Naprosyn] 500 mg PO BID #14 tablet 21  Unknown Rx


 


cephALEXin [Keflex] 500 mg PO Q8HR #28 cap 21  Unknown Rx














ED Review of Systems


ROS: 


Stated complaint: FEVER/CHILLS/BODYACHES


Other details as noted in HPI





Constitutional: chills, fever


Respiratory: no symptoms reported


Cardiovascular: as per HPI


Endocrine: no symptoms reported





Physical Exam





- Physical Exam


Vital Signs: 


                                   Vital Signs











  21





  23:48 03:20


 


Temperature  98.1 F


 


Pulse Rate  77


 


Respiratory 20 17





Rate  


 


Blood Pressure  107/66





[Left]  


 


O2 Sat by Pulse  99





Oximetry  











Physical Exam: 





GENERAL: The patient is well-developed well-nourished female sleeping on the 

covers not appearing to be in acute distress. []


HEENT: Normocephalic.  Atraumatic.  Alopecia


NECK: Supple.  No meningitic signs are noted.  There is no adenopathy noted.


CHEST/LUNGS: Clear to auscultation.  There is no respiratory distress noted.


HEART/CARDIOVASCULAR: Regular.  There is no tachycardia.  There is no gallop rub

 or murmur.


ABDOMEN: Abdomen is soft, nontender.  Patient has normal bowel sounds.  There is

 no abdominal distention.


SKIN: There is no diaphoresis.


NEURO: The patient is asleep but awakens to verbal and tactile stimuli to answer

 questions.  Patient seems annoyed by questioning.  The patient is cooperative. 

 The patient has no focal neurologic deficits.  The patient has normal speech


MUSCULOSKELETAL:  There is no evidence of acute injury.





ED Course


                                   Vital Signs











  21





  23:48 03:20


 


Temperature  98.1 F


 


Pulse Rate  77


 


Respiratory 20 17





Rate  


 


Blood Pressure  107/66





[Left]  


 


O2 Sat by Pulse  99





Oximetry  














ED Medical Decision Making





- Lab Data


Result diagrams: 


                                 21 23:35





                                 21 23:35





                                Laboratory Tests











  21





  23:35 23:35 Unknown


 


WBC  4.7  


 


RBC  4.10  


 


Hgb  10.9  


 


Hct  33.9  


 


MCV  83  


 


MCH  27 L  


 


MCHC  32  


 


RDW  14.4  


 


Plt Count  221  


 


Lymph % (Auto)  5.6 L  


 


Mono % (Auto)  9.0 H  


 


Eos % (Auto)  12.5 H  


 


Baso % (Auto)  0.5  


 


Lymph # (Auto)  0.3 L  


 


Mono # (Auto)  0.4  


 


Eos # (Auto)  0.6 H  


 


Baso # (Auto)  0.0  


 


Seg Neutrophils %  72.4 H  


 


Seg Neutrophils #  3.4  


 


Sodium   140 


 


Potassium   3.9 


 


Chloride   102.7 


 


Carbon Dioxide   30 


 


Anion Gap   11 


 


BUN   12 


 


Creatinine   0.8 


 


Estimated GFR   > 60 


 


BUN/Creatinine Ratio   15 


 


Glucose   94 


 


Calcium   9.3 


 


Total Bilirubin   0.20 


 


AST   24 


 


ALT   14 


 


Alkaline Phosphatase   89 


 


Troponin T    < 0.010


 


Total Protein   7.8 


 


Albumin   4.2 


 


Albumin/Globulin Ratio   1.2 








                                Laboratory Tests











  21





  23:35 23:35 05:05


 


WBC  4.7  


 


RBC  4.10  


 


Hgb  10.9  


 


Hct  33.9  


 


MCV  83  


 


MCH  27 L  


 


MCHC  32  


 


RDW  14.4  


 


Plt Count  221  


 


Lymph % (Auto)  5.6 L  


 


Mono % (Auto)  9.0 H  


 


Eos % (Auto)  12.5 H  


 


Baso % (Auto)  0.5  


 


Lymph # (Auto)  0.3 L  


 


Mono # (Auto)  0.4  


 


Eos # (Auto)  0.6 H  


 


Baso # (Auto)  0.0  


 


Seg Neutrophils %  72.4 H  


 


Seg Neutrophils #  3.4  


 


Sodium   140 


 


Potassium   3.9 


 


Chloride   102.7 


 


Carbon Dioxide   30 


 


Anion Gap   11 


 


BUN   12 


 


Creatinine   0.8 


 


Estimated GFR   > 60 


 


BUN/Creatinine Ratio   15 


 


Glucose   94 


 


Calcium   9.3 


 


Total Bilirubin   0.20 


 


AST   24 


 


ALT   14 


 


Alkaline Phosphatase   89 


 


Troponin T    < 0.010


 


Total Protein   7.8 


 


Albumin   4.2 


 


Albumin/Globulin Ratio   1.2 














  21





  Unknown


 


WBC 


 


RBC 


 


Hgb 


 


Hct 


 


MCV 


 


MCH 


 


MCHC 


 


RDW 


 


Plt Count 


 


Lymph % (Auto) 


 


Mono % (Auto) 


 


Eos % (Auto) 


 


Baso % (Auto) 


 


Lymph # (Auto) 


 


Mono # (Auto) 


 


Eos # (Auto) 


 


Baso # (Auto) 


 


Seg Neutrophils % 


 


Seg Neutrophils # 


 


Sodium 


 


Potassium 


 


Chloride 


 


Carbon Dioxide 


 


Anion Gap 


 


BUN 


 


Creatinine 


 


Estimated GFR 


 


BUN/Creatinine Ratio 


 


Glucose 


 


Calcium 


 


Total Bilirubin 


 


AST 


 


ALT 


 


Alkaline Phosphatase 


 


Troponin T  < 0.010


 


Total Protein 


 


Albumin 


 


Albumin/Globulin Ratio 














- EKG Data


-: EKG Interpreted by Me


EKG shows normal: sinus rhythm


Rate: normal





- EKG Data


When compared to previous EKG there are: no significant change


Interpretation: unchanged when compared t (2021), nonspecific ST-T wave esha

 (Unchanged from EKG dated 2021)





- Radiology Data


Radiology results: report reviewed (Chest x-ray), image reviewed (Chest x-ray)


interpreted by me: 





Chest x-ray-no focal infiltrates, no pneumothorax





[address] [address] 

XRay Report Signed Patient: MING GONZALES MR#: [id number] 05 : 1980 

Acct:[id number] Age/Sex: 40 / F ADM Date: 21 Loc: ED Attending Dr: 

Ordering Physician: GHADA BERUMEN Date of Service: 21 Procedure(s): XR 

chest routine 2V Accession Number(s): [id number] cc: GHADA BERUMEN Fluoro Time In 

Minutes: CHEST 2 VIEWS INDICATION: fever. COMPARISON: 2021 FINDINGS: 

Support devices: None. Heart: Within normal limits. Lungs/Pleura: No acute air 

space or interstitial disease. No significant pleural effusion. IMPRESSION: No 

acute findings. Signer Name: Jean Pierre Gamble MD Signed: 2021 12:03 AM 

Workstation Name: [id number] Transcribed By: ES Dictated By: Jean Pierre Gamble MD Electronically Authenticated By: Jean Pierre Gamble MD Signed 

Date/Time: 21 0003 DD/DT: 21 0002 TD/TT: 


Print Cancel











- Differential Diagnosis


Postvaccination fever


Critical care attestation.: 


If time is entered above; I have spent that time in minutes in the direct care 

of this critically ill patient, excluding procedure time.








ED Disposition


Clinical Impression: 


 Postvaccination fever





Disposition: - TO HOME OR SELFCARE


Is pt being admited?: No


Does the pt Need Aspirin: No


Condition: Stable


Additional Instructions: 


You can alternate between Tylenol and ibuprofen following the dosing directions 

on the package to help with fever and body aches from the vaccination.  Return 

to the emergency department should you develop worsening symptoms, inability to 

tolerate food or liquids, high fever or any other concerns


Referrals: 


PRIMARY CARE,MD [Primary Care Provider] - 3-5 Days


Time of Disposition: 05:51

## 2021-05-10 NOTE — ELECTROCARDIOGRAPH REPORT
[facility]

                                       

Test Date:    2021               Test Time:    04:51:19

Pat Name:     MING OGNZALES           Department:   

Patient ID:   [id number]          Room:          

Gender:       F                        Technician:   marlene

:          1980               Requested By: ALFREDO ORTIZ

Order Number: [id number]              Reading MD:   Charlie Alcala

                                 Measurements

Intervals                              Axis          

Rate:         68                       P:            36

IA:           118                      QRS:          75

QRSD:         89                       T:            60

QT:           430                                    

QTc:          456                                    

                           Interpretive Statements

Sinus rhythm

Compared to ECG 2021 03:03:54

No significant changes

Electronically Signed On 5- 11:13:09 EDT by Charlie Alcala

## 2021-05-14 ENCOUNTER — HOSPITAL ENCOUNTER (EMERGENCY)
Dept: HOSPITAL 5 - ED | Age: 41
Discharge: HOME | End: 2021-05-14
Payer: MEDICAID

## 2021-05-14 VITALS — DIASTOLIC BLOOD PRESSURE: 83 MMHG | SYSTOLIC BLOOD PRESSURE: 119 MMHG

## 2021-05-14 DIAGNOSIS — Z79.899: ICD-10-CM

## 2021-05-14 DIAGNOSIS — B34.9: Primary | ICD-10-CM

## 2021-05-14 DIAGNOSIS — Z21: ICD-10-CM

## 2021-05-14 DIAGNOSIS — Z20.822: ICD-10-CM

## 2021-05-14 DIAGNOSIS — F17.200: ICD-10-CM

## 2021-05-14 PROCEDURE — 99282 EMERGENCY DEPT VISIT SF MDM: CPT

## 2021-05-14 NOTE — EMERGENCY DEPARTMENT REPORT
ED Fever HPI





- General


Chief Complaint: Nausea/Vomiting/Diarrhea


Stated Complaint: FLU LIKE SYMPTOMS


PUI?: Yes


Time Seen by Provider: 05/14/21 06:16


Source: patient


Exam Limitations: no limitations





- History of Present Illness


Initial Comments: 





cc: "I had the COVID shot.  I have fever and throwing up."





HPI:  This is a 39 yo female with hx of HIV who presents with fever and vomiting

since COVID 19 vaccine.  +diarrhea.  Denies headache, shortness of breath, chest

pain, cough, abdominal pain.  Gradual onset of symptoms 3 days ago


Timing/Duration: other (3 days)


Fever Severity/Quality: greater than 100.5 F


Associated Symptoms: nausea/vomiting





ED Review of Systems


ROS: 


Stated complaint: FLU LIKE SYMPTOMS


Other details as noted in HPI





Comment: Unobtainable due to pts medical conditions


Constitutional: fever


Respiratory: denies: cough, shortness of breath


Gastrointestinal: vomiting, diarrhea.  denies: abdominal pain





ED Past Medical Hx





- Past Medical History


Previous Medical History?: Yes


Additional medical history: AIDS





- Surgical History


Past Surgical History?: No





- Social History


Smoking Status: Current Every Day Smoker


Substance Use Type: Alcohol





- Medications


Home Medications: 


                                Home Medications











 Medication  Instructions  Recorded  Confirmed  Last Taken  Type


 


Docusate Sodium [Colace] 100 mg PO BID #30 capsule 12/15/20  Unknown Rx


 


Fluconazole (Nf) [Diflucan TAB] 150 mg PO ONCE #1 tablet 12/15/20  Unknown Rx


 


Hydrocortisone [Anusol-Hc 2.5% TOP 1 applicatio RC BID #1 tube 12/15/20  Unknown

 Rx





CREAM]     


 


Nitrofurantoin Mono/M-Cryst 100 mg PO Q12HR 7 Days #14 capsule 12/15/20  Unknown

Rx





[Macrobid CAP]     


 


polyethylene glycoL 3350 [Miralax 17 gm PO BID PRN #14 packet 12/15/20  Unknown 

Rx





3350]     


 


Naproxen [Naprosyn] 500 mg PO BID #14 tablet 04/26/21  Unknown Rx


 


cephALEXin [Keflex] 500 mg PO Q8HR #28 cap 04/26/21  Unknown Rx


 


Promethazine [Phenergan] 25 mg PO Q6HR PRN #10 tab 05/14/21  Unknown Rx














ED Physical Exam





- General


Limitations: No Limitations


General appearance: alert, in no apparent distress





- Head


Head exam: Present: atraumatic, normocephalic





- Eye


Eye exam: Present: normal appearance





- ENT


ENT exam: Present: mucous membranes moist





- Neck


Neck exam: Present: normal inspection, full ROM





- Respiratory


Respiratory exam: Present: normal lung sounds bilaterally.  Absent: respiratory 

distress, wheezes, rales, rhonchi





- Cardiovascular


Cardiovascular Exam: Present: regular rate, normal rhythm, normal heart sounds. 

 Absent: systolic murmur, diastolic murmur, rubs, gallop





- GI/Abdominal


GI/Abdominal exam: Present: soft, normal bowel sounds.  Absent: distended, 

tenderness, guarding, rebound





- Extremities Exam


Extremities exam: Present: normal inspection





- Back Exam


Back exam: Present: normal inspection





- Neurological Exam


Neurological exam: Present: alert, oriented X3





- Psychiatric


Psychiatric exam: Present: normal affect, normal mood





- Skin


Skin exam: Present: warm, dry, intact, normal color.  Absent: rash





ED Course





                                   Vital Signs











  05/14/21 05/14/21





  04:38 06:05


 


Temperature 100.2 F H 


 


Pulse Rate  104 H


 


Respiratory  18





Rate  


 


Blood Pressure  119/83





[Left]  


 


O2 Sat by Pulse  96





Oximetry  














ED Medical Decision Making





- Medical Decision Making





Ms. Hunter is a 39 yo female with hx of HIV who presents with fever, vomiting,

 diarrhea.  Appears well.  Suspect viral syndrome COVID-19 vs influenza. Unknown

 CD4 count or viral load.  Opportunistic infection is a possibility.  Referred 

to ID physician.  Supportive care with promethazine prescription.  dc'd home


Critical care attestation.: 


If time is entered above; I have spent that time in minutes in the direct care 

of this critically ill patient, excluding procedure time.








ED Disposition


Clinical Impression: 


 Viral syndrome, Suspected COVID-19 virus infection





Disposition: DC-01 TO HOME OR SELFCARE


Is pt being admited?: No


Does the pt Need Aspirin: No


Condition: Stable


Prescriptions: 


Promethazine [Phenergan] 25 mg PO Q6HR PRN #10 tab


 PRN Reason: Nausea


Referrals: 


NERIS BROOKS MD [Staff Physician] - 3-5 Days

## 2021-08-20 ENCOUNTER — HOSPITAL ENCOUNTER (EMERGENCY)
Dept: HOSPITAL 5 - ED | Age: 41
LOS: 1 days | Discharge: HOME | End: 2021-08-21
Payer: MEDICAID

## 2021-08-20 VITALS — DIASTOLIC BLOOD PRESSURE: 89 MMHG | SYSTOLIC BLOOD PRESSURE: 142 MMHG

## 2021-08-20 DIAGNOSIS — B86: Primary | ICD-10-CM

## 2021-08-20 DIAGNOSIS — F17.200: ICD-10-CM

## 2021-08-20 DIAGNOSIS — Z72.89: ICD-10-CM

## 2021-08-20 DIAGNOSIS — Z79.899: ICD-10-CM

## 2021-08-20 PROCEDURE — 99282 EMERGENCY DEPT VISIT SF MDM: CPT

## 2021-08-21 NOTE — EMERGENCY DEPARTMENT REPORT
- General


Chief complaint: Skin Rash


Stated complaint: BED BUGS


Time Seen by Provider: 08/21/21 12:51


Source: EMS


Mode of arrival: Stretcher


Limitations: No Limitations





- History of Present Illness


Initial comments: 





40-year-old -American female presents to the emergency room reporting 

that she has scabies and needs treatment.  Patient reports she has a history of 

scabies and that her house is infected she says she is too lazy to get someone 

to come out to disinfect her home.


MD complaint: rash


Onset/Timing: 3


-: month(s)


Tetanus Up to Date: yes


Location: generalized


Severity scale (0 -10): 5


Quality: burning


Consistency: constant


Improves with: none


Worsens with: other (Itching)


Associated symptoms: denies other symptoms





- Related Data


                                  Previous Rx's











 Medication  Instructions  Recorded  Last Taken  Type


 


Docusate Sodium [Colace] 100 mg PO BID #30 capsule 12/15/20 Unknown Rx


 


Fluconazole (Nf) [Diflucan TAB] 150 mg PO ONCE #1 tablet 12/15/20 Unknown Rx


 


Hydrocortisone [Anusol-Hc 2.5% TOP 1 applicatio RC BID #1 tube 12/15/20 Unknown 

Rx





CREAM]    


 


Nitrofurantoin Mono/M-Cryst 100 mg PO Q12HR 7 Days #14 capsule 12/15/20 Unknown 

Rx





[Macrobid CAP]    


 


polyethylene glycoL 3350 [Miralax 17 gm PO BID PRN #14 packet 12/15/20 Unknown 

Rx





3350]    


 


Naproxen [Naprosyn] 500 mg PO BID #14 tablet 04/26/21 Unknown Rx


 


cephALEXin [Keflex] 500 mg PO Q8HR #28 cap 04/26/21 Unknown Rx


 


Promethazine [Phenergan] 25 mg PO Q6HR PRN #10 tab 05/14/21 Unknown Rx


 


Permethrin 5% [Acticin 5% CREAM] 1 applicatio TP ONCE #60 gram 08/21/21 Unknown 

Rx











                                    Allergies











Allergy/AdvReac Type Severity Reaction Status Date / Time


 


No Known Allergies Allergy   Unverified 12/14/20 23:38














Abscess Boil HPI





- HPI


Chief Complaint: Skin Rash


Stated Complaint: BED BUGS


Time Seen by Provider: 08/21/21 12:51


Home Medications: 


                                  Previous Rx's











 Medication  Instructions  Recorded  Last Taken  Type


 


Docusate Sodium [Colace] 100 mg PO BID #30 capsule 12/15/20 Unknown Rx


 


Fluconazole (Nf) [Diflucan TAB] 150 mg PO ONCE #1 tablet 12/15/20 Unknown Rx


 


Hydrocortisone [Anusol-Hc 2.5% TOP 1 applicatio RC BID #1 tube 12/15/20 Unknown 

Rx





CREAM]    


 


Nitrofurantoin Mono/M-Cryst 100 mg PO Q12HR 7 Days #14 capsule 12/15/20 Unknown 

Rx





[Macrobid CAP]    


 


polyethylene glycoL 3350 [Miralax 17 gm PO BID PRN #14 packet 12/15/20 Unknown 

Rx





3350]    


 


Naproxen [Naprosyn] 500 mg PO BID #14 tablet 04/26/21 Unknown Rx


 


cephALEXin [Keflex] 500 mg PO Q8HR #28 cap 04/26/21 Unknown Rx


 


Promethazine [Phenergan] 25 mg PO Q6HR PRN #10 tab 05/14/21 Unknown Rx


 


Permethrin 5% [Acticin 5% CREAM] 1 applicatio TP ONCE #60 gram 08/21/21 Unknown 

Rx











Allergies/Adverse Reactions: 


                                    Allergies











Allergy/AdvReac Type Severity Reaction Status Date / Time


 


No Known Allergies Allergy   Unverified 12/14/20 23:38














ED Review of Systems


ROS: 


Stated complaint: BED BUGS


Other details as noted in HPI





Comment: All other systems reviewed and negative





ED Past Medical Hx





- Past Medical History


Previous Medical History?: No


Additional medical history: AIDS





- Surgical History


Past Surgical History?: No





- Social History


Smoking Status: Current Every Day Smoker


Substance Use Type: Alcohol





- Medications


Home Medications: 


                                Home Medications











 Medication  Instructions  Recorded  Confirmed  Last Taken  Type


 


Docusate Sodium [Colace] 100 mg PO BID #30 capsule 12/15/20  Unknown Rx


 


Fluconazole (Nf) [Diflucan TAB] 150 mg PO ONCE #1 tablet 12/15/20  Unknown Rx


 


Hydrocortisone [Anusol-Hc 2.5% TOP 1 applicatio RC BID #1 tube 12/15/20  Unknown

 Rx





CREAM]     


 


Nitrofurantoin Mono/M-Cryst 100 mg PO Q12HR 7 Days #14 capsule 12/15/20  Unknown

Rx





[Macrobid CAP]     


 


polyethylene glycoL 3350 [Miralax 17 gm PO BID PRN #14 packet 12/15/20  Unknown 

Rx





3350]     


 


Naproxen [Naprosyn] 500 mg PO BID #14 tablet 04/26/21  Unknown Rx


 


cephALEXin [Keflex] 500 mg PO Q8HR #28 cap 04/26/21  Unknown Rx


 


Promethazine [Phenergan] 25 mg PO Q6HR PRN #10 tab 05/14/21  Unknown Rx


 


Permethrin 5% [Acticin 5% CREAM] 1 applicatio TP ONCE #60 gram 08/21/21  Unknown

Rx














ED Physical Exam





- General


Limitations: No Limitations


General appearance: alert, in no apparent distress, cachectic





- Head


Head exam: Present: atraumatic, normocephalic, normal inspection





- Eye


Eye exam: Present: normal appearance





- ENT


ENT exam: Absent: normal external ear exam





- Neck


Neck exam: Present: full ROM





- Respiratory


Respiratory exam: Absent: respiratory distress, accessory muscle use





- Cardiovascular


Cardiovascular Exam: Present: regular rate





- Extremities Exam


Extremities exam: Present: normal inspection, full ROM





- Back Exam


Back exam: Present: normal inspection





- Neurological Exam


Neurological exam: Present: alert, oriented X3, normal gait





- Psychiatric


Psychiatric exam: Present: anxious





- Skin


Skin exam: Present: rash





- Expanded Skin Exam


  ** Expanded


Type of lesion: Present: rash


Distribution of rash: generalized, neck, back, abdomen, RUE, LUE, RLE, LLE


Description of rash: Present: tenderness, crusting





ED Course





                                   Vital Signs











  08/20/21





  23:44


 


Temperature 98.0 F


 


Pulse Rate 89


 


Respiratory 18





Rate 


 


Blood Pressure 142/89





[Right] 


 


O2 Sat by Pulse 99





Oximetry 














ED Medical Decision Making





- Medical Decision Making





40-year-old -American female presents to the emergency room reporting 

that she has scabies and needs treatment.  Patient reports she has a history of 

scabies and that her house is infected she says she is too lazy to get someone 

to come out to disinfect her home.








Patient will be discharged with a prescription for permethrin 5% 60 g 





Scabies: Topical: Cream 5%: Thoroughly massage cream (30 g for average adult) 

from head to soles of feet; leave on for 8 to 14 hours before removing (shower 

or bath); for elderly patients, also apply on the hairline, neck, scalp, temple,

 and forehead; may repeat if living mites are observed 14 days after first 

treatment; one application is generally curative.


Critical care attestation.: 


If time is entered above; I have spent that time in minutes in the direct care 

of this critically ill patient, excluding procedure time.








ED Disposition


Clinical Impression: 


 Crusted scabies





Disposition: 01 HOME / SELF CARE / HOMELESS


Is pt being admited?: No


Does the pt Need Aspirin: No


Condition: Stable


Instructions:  Scabies, Adult


Additional Instructions: 


Scabies: Topical: Cream 5%: Thoroughly massage cream (30 g for average adult) 

from head to soles of feet; leave on for 8 to 14 hours before removing (shower 

or bath); for elderly patients, also apply on the hairline, neck, scalp, temple,

 and forehead; may repeat if living mites are observed 14 days after first 

treatment; one application is generally curative.


Prescriptions: 


Permethrin 5% [Acticin 5% CREAM] 1 applicatio TP ONCE #60 gram


Referrals: 


[facility], MD [Primary Care Provider] - 3-5 Days